# Patient Record
Sex: MALE | Employment: OTHER | URBAN - METROPOLITAN AREA
[De-identification: names, ages, dates, MRNs, and addresses within clinical notes are randomized per-mention and may not be internally consistent; named-entity substitution may affect disease eponyms.]

---

## 2022-03-14 ENCOUNTER — OFFICE VISIT (OUTPATIENT)
Dept: UROLOGY | Facility: CLINIC | Age: 67
End: 2022-03-14
Payer: MEDICARE

## 2022-03-14 DIAGNOSIS — R35.0 URINARY FREQUENCY: ICD-10-CM

## 2022-03-14 DIAGNOSIS — R39.198 DIFFICULTY VOIDING: Primary | ICD-10-CM

## 2022-03-14 DIAGNOSIS — R39.15 URINARY URGENCY: ICD-10-CM

## 2022-03-14 DIAGNOSIS — N52.9 ERECTILE DYSFUNCTION, UNSPECIFIED ERECTILE DYSFUNCTION TYPE: ICD-10-CM

## 2022-03-14 PROCEDURE — 99204 PR OFFICE/OUTPT VISIT, NEW, LEVL IV, 45-59 MIN: ICD-10-PCS | Mod: S$GLB,,, | Performed by: NURSE PRACTITIONER

## 2022-03-14 PROCEDURE — 99204 OFFICE O/P NEW MOD 45 MIN: CPT | Mod: S$GLB,,, | Performed by: NURSE PRACTITIONER

## 2022-03-14 RX ORDER — ATORVASTATIN CALCIUM 80 MG/1
80 TABLET, FILM COATED ORAL NIGHTLY
COMMUNITY
Start: 2022-01-24

## 2022-03-14 RX ORDER — OXYBUTYNIN CHLORIDE 5 MG/1
5 TABLET ORAL 3 TIMES DAILY
Qty: 90 TABLET | Refills: 11 | Status: SHIPPED | OUTPATIENT
Start: 2022-03-14 | End: 2023-03-14

## 2022-03-14 RX ORDER — LEVOTHYROXINE SODIUM 125 UG/1
125 TABLET ORAL DAILY
COMMUNITY
Start: 2022-01-20

## 2022-03-14 RX ORDER — LISINOPRIL AND HYDROCHLOROTHIAZIDE 10; 12.5 MG/1; MG/1
1 TABLET ORAL DAILY
COMMUNITY
Start: 2022-02-14

## 2022-03-14 RX ORDER — TADALAFIL 20 MG/1
20 TABLET ORAL DAILY PRN
Qty: 10 TABLET | Refills: 11 | Status: SHIPPED | OUTPATIENT
Start: 2022-03-14 | End: 2022-12-22 | Stop reason: SDUPTHER

## 2022-03-14 RX ORDER — SILDENAFIL CITRATE 20 MG/1
TABLET ORAL
COMMUNITY
Start: 2021-12-09 | End: 2022-12-22

## 2022-03-14 NOTE — PROGRESS NOTES
Subjective:       Patient ID: Julio Bhagat is a 67 y.o. male.    Chief Complaint: Urinary Frequency, Other (Slow stream- pt states his stream sprays out), Benign Prostatic Hypertrophy, and Erectile Dysfunction (Pt would like to try something other then viagra. It gives him awful headaches.)      HPI: 67-year-old male, new to [facility], referred for difficulty voiding.  Patient states he has has a history of difficulty voiding.  States he has a slow stream.  Patient states he will have episodes of spraying.  Will have frequency and urgency.  States he has about 1 episode of nocturia per night.    Patient states he had tried Flomax 0.4 mg daily at 1 time with no improvement.    Patient does report that approximately 30 years ago he was diagnosed with a urethral stricture.    Patient also complains of some erectile dysfunction.  Patient uses generic sildenafil.  Patient states he gets a headache with sildenafil.  Patient states he feels at times does not work very well.    Patient denies any odor the urine.  Denies any fever.  Denies any blood in the urine.  Denies any significant weight loss.  No other urinary complaints at this time.    Patient had a PSA by his PCP on 02/23/2022.  PSA at that time was 0.99.       Past Medical History:   Past Medical History:   Diagnosis Date    Coronary artery disease     Hypertension        Past Surgical Historical:   Past Surgical History:   Procedure Laterality Date    CATARACT EXTRACTION      spinal injections          Medications:   Medication List with Changes/Refills   New Medications    OXYBUTYNIN (DITROPAN) 5 MG TAB    Take 1 tablet (5 mg total) by mouth 3 (three) times daily.    TADALAFIL (CIALIS) 20 MG TAB    Take 1 tablet (20 mg total) by mouth daily as needed (ed).   Current Medications    ATORVASTATIN (LIPITOR) 80 MG TABLET    Take 80 mg by mouth nightly.    LEVOTHYROXINE (SYNTHROID) 125 MCG TABLET    Take 125 mcg by mouth once daily.     LISINOPRIL-HYDROCHLOROTHIAZIDE (PRINZIDE,ZESTORETIC) 10-12.5 MG PER TABLET    Take 1 tablet by mouth once daily.    SILDENAFIL (REVATIO) 20 MG TAB    TAKE 2 TO 5 TABLETS BY MOUTH ONCE A DAY AS NEEDED        Past Social History:   Social History     Socioeconomic History    Marital status:    Tobacco Use    Smoking status: Never Smoker    Smokeless tobacco: Never Used   Substance and Sexual Activity    Alcohol use: Yes    Drug use: Never    Sexual activity: Yes       Allergies: Review of patient's allergies indicates:  No Known Allergies     Family History: History reviewed. No pertinent family history.     Review of Systems:  Review of Systems   Constitutional: Negative for activity change and appetite change.   HENT: Negative for congestion and dental problem.    Eyes: Negative for visual disturbance.   Respiratory: Negative for chest tightness and shortness of breath.    Cardiovascular: Negative for chest pain.   Gastrointestinal: Negative for abdominal distention and abdominal pain.   Genitourinary: Positive for difficulty urinating, frequency and urgency. Negative for decreased urine volume, dysuria, enuresis, flank pain, genital sores, hematuria, penile discharge, penile pain, penile swelling, scrotal swelling and testicular pain.   Musculoskeletal: Negative for back pain and neck pain.   Skin: Negative for color change.   Neurological: Negative for dizziness.   Hematological: Negative for adenopathy.   Psychiatric/Behavioral: Negative for agitation, behavioral problems and confusion.       Physical Exam:  Physical Exam  Vitals and nursing note reviewed.   Constitutional:       Appearance: He is well-developed.   HENT:      Head: Normocephalic.   Eyes:      Pupils: Pupils are equal, round, and reactive to light.   Cardiovascular:      Rate and Rhythm: Normal rate and regular rhythm.      Heart sounds: Normal heart sounds.   Pulmonary:      Effort: Pulmonary effort is normal.      Breath sounds:  Normal breath sounds.   Abdominal:      General: Bowel sounds are normal.      Palpations: Abdomen is soft.   Genitourinary:     Penis: Normal.       Prostate: Enlarged.      Rectum: Normal.      Comments: Prostate is mildly enlarged.  Prostate smooth with no nodules and nontender.  Prostate is symmetrical.  Musculoskeletal:         General: Normal range of motion.      Cervical back: Normal range of motion and neck supple.   Skin:     General: Skin is warm and dry.   Neurological:      Mental Status: He is alert and oriented to person, place, and time.   Psychiatric:         Behavior: Behavior normal.       Bladder scan:  0 cc  Urinalysis:  Normal    Assessment/Plan:   1. Difficulty voiding/frequency/urgency:  Patient's bladder scan is 0 cc. He has failed Flomax in the past.  Will do trial of oxybutynin XL 15 mg daily.  Will schedule patient for cysto for further evaluation and rule out stricture.    2. Erectile dysfunction:  Patient will do trial generic Cialis 20 mg as needed.  Patient will notify us of the results.    Follow-up to be arranged pending cysto.  Problem List Items Addressed This Visit    None     Visit Diagnoses     Difficulty voiding    -  Primary    Relevant Orders    Cystoscopy    Urinary frequency        Relevant Medications    oxybutynin (DITROPAN) 5 MG Tab    Other Relevant Orders    Cystoscopy    Urinary urgency        Relevant Medications    oxybutynin (DITROPAN) 5 MG Tab    Other Relevant Orders    Cystoscopy    Erectile dysfunction, unspecified erectile dysfunction type        Relevant Medications    tadalafiL (CIALIS) 20 MG Tab

## 2022-06-06 ENCOUNTER — TELEPHONE (OUTPATIENT)
Dept: UROLOGY | Facility: CLINIC | Age: 67
End: 2022-06-06
Payer: MEDICARE

## 2022-06-07 ENCOUNTER — PROCEDURE VISIT (OUTPATIENT)
Dept: UROLOGY | Facility: CLINIC | Age: 67
End: 2022-06-07
Payer: MEDICARE

## 2022-06-07 VITALS
SYSTOLIC BLOOD PRESSURE: 112 MMHG | BODY MASS INDEX: 29.27 KG/M2 | HEIGHT: 70 IN | DIASTOLIC BLOOD PRESSURE: 58 MMHG | RESPIRATION RATE: 20 BRPM | OXYGEN SATURATION: 100 % | HEART RATE: 42 BPM | WEIGHT: 204.44 LBS

## 2022-06-07 DIAGNOSIS — R39.198 DIFFICULTY VOIDING: ICD-10-CM

## 2022-06-07 DIAGNOSIS — R35.0 URINARY FREQUENCY: ICD-10-CM

## 2022-06-07 DIAGNOSIS — R39.15 URINARY URGENCY: ICD-10-CM

## 2022-06-07 PROCEDURE — 52000 CYSTOURETHROSCOPY: CPT | Mod: S$GLB,,, | Performed by: UROLOGY

## 2022-06-07 PROCEDURE — 52000 CYSTOSCOPY: ICD-10-PCS | Mod: S$GLB,,, | Performed by: UROLOGY

## 2022-06-07 NOTE — PROCEDURES
Cystoscopy    Date/Time: 6/7/2022 11:00 AM  Performed by: Jm Pleitez MD  Authorized by: Saw Villalobos NP     Consent Done?:  Yes (Written)  Timeout: prior to procedure the correct patient, procedure, and site was verified    Prep: patient was prepped and draped in usual sterile fashion    Anesthesia:  Intraurethral instillation  Indications: hematuria    Position:  Supine  Anesthesia:  Intraurethral instillation  Patient sedated?: No    Preparation: Patient was prepped and draped in usual sterile fashion    Scope type:  Flexible cystoscope  External exam normal: Yes    Urethra normal: Yes    Prostate normal: Yes    Comments:      Patient was brought to the procedure room placed on the table padded prepped and draped in usual sterile fashion in supine position. The cystoscope was inserted into the urethra and advanced the urethra was normal prostate showed greater than expected enlargement for patient's age, the bladder is entered and inspected is found to be free of tumor stone or foreign body.  Bilateral ureteral orifices were identified and noted to be normal in appearance with clear efflux of urine at this point the scope was removed the patient tolerated the procedure well there were no complications    Impression:  Patient's prostate is certainly larger than average I believe he would benefit some degree from a Transurethral resection of the prostate he is resistant to alpha blockers as they cause retrograde ejaculation and that causes him some distress.  This point the patient prefers to stay off of any medication I have informed him that if his symptoms worsen he is free to come back and we can discuss TURP or additional medications

## 2022-06-07 NOTE — PATIENT INSTRUCTIONS
Patient Education       Cystoscopy Discharge Instructions   About this topic   Your kidneys make urine. It is stored in your bladder. The urethra is a tube at the bottom of the bladder. Urine flows out of this tube. Sometimes, there is a blockage and urine is not able to leave the body.  A cystoscopy is a procedure that lets the doctor see the inside of your bladder and urethra. The doctor does it to:  Look for stones or tumors blocking the bladder and urethra  Look for changes or injury inside the bladder  Take a tissue sample from the inside of your bladder  Look for reasons for blood in the urine, pain with urination, or why you are passing urine often  Look for prostate problems     What care is needed at home?   Ask your doctor what you need to do when you go home. Make sure you ask questions if you do not understand what the doctor says. This way you will know what you need to do.  Take a warm bath or use a warm wet washcloth over the opening to the urethra. This will help to ease any pain. Do this as needed.  Drink 6 to 8 glasses of water a day and 3 to 4 glasses in the first few hours after the procedure to flush out your bladder and reduce irritation.  You may see some blood in your urine for a few days. This is normal.  Empty your bladder as soon as you feel the need to. Don't delay going to the bathroom. It stretches and weakens the bladder.  What follow-up care is needed?   Your doctor may ask you to make visits to the office to check on your progress. Be sure to keep these visits.  If you had a biopsy, talk with your doctor about the results.  What drugs may be needed?   The doctor may order drugs to:  Help with pain  Fight an infection  Help with bladder spasms  Will physical activity be limited?   Talk to your doctor about when you may go back to your normal activities like work, driving, or sex.  What problems could happen?   Bleeding  Infection  Injury to the bladder and urethra  Discomfort in the  urethra area  Burning sensation for a short time  Upset stomach  When do I need to call the doctor?   Signs of infection. These include a fever of 100.4°F (38°C) or higher, chills, pain with passing urine.  Pain that does not go away even with drugs or that lasts longer than 2 days  Too much blood in your urine  Passing large dime-sized clots  Cloudy urine  Little or no urine or not able to pass urine  Abdominal pain and nausea  Teach Back: Helping You Understand   The Teach Back Method helps you understand the information we are giving you. After you talk with the staff, tell them in your own words what you learned. This helps to make sure the staff has described each thing clearly. It also helps to explain things that may have been confusing. Before going home, make sure you can do these:  I can tell you about my procedure.  I can tell you what may help ease my pain.  I can tell you what I will do if I have a fever, chills, or am not able to pass urine.  Where can I learn more?   American Cancer Society  https://www.cancer.org/treatment/understanding-your-diagnosis/tests/endoscopy/cystoscopy.html   Can[employer] Research [employer]  https://www.cancerresearchuk.org/about-cancer/bladder-cancer/getting-diagnosed/tests-diagnose/cystoscopy   NHS Choices  http://www.nhs.uk/conditions/Cystoscopy/Pages/Introduction.aspx   Last Reviewed Date   2021-04-22  Consumer Information Use and Disclaimer   This information is not specific medical advice and does not replace information you receive from your health care provider. This is only a brief summary of general information. It does NOT include all information about conditions, illnesses, injuries, tests, procedures, treatments, therapies, discharge instructions or life-style choices that may apply to you. You must talk with your health care provider for complete information about your health and treatment options. This information should not be used to decide whether or not to accept your  health care providers advice, instructions or recommendations. Only your health care provider has the knowledge and training to provide advice that is right for you.  Copyright   Copyright © 2021 [employer] and its affiliates and/or licensors. All rights reserved.

## 2022-12-20 ENCOUNTER — TELEPHONE (OUTPATIENT)
Dept: UROLOGY | Facility: CLINIC | Age: 67
End: 2022-12-20
Payer: MEDICARE

## 2022-12-20 NOTE — TELEPHONE ENCOUNTER
----- Message from Justo Randle sent at 12/20/2022 10:40 AM CST -----  Contact: pt      Who Called:jonathan   Who Left Message for Patient:  Does the patient know what this is regarding?:pt needs refill on tadalafiL (CIALIS) 20 MG Tab  Would the patient rather a call back or a response via [facility]?   Best Call Back Number:.[phone]    Additional Information: .  [id number] - [facility] [id number] [facility] [address]  Phone: [phone] Fax: [phone]    Pt would like a 15 or 20 day supply

## 2022-12-22 DIAGNOSIS — N52.9 ERECTILE DYSFUNCTION, UNSPECIFIED ERECTILE DYSFUNCTION TYPE: ICD-10-CM

## 2022-12-22 RX ORDER — TADALAFIL 20 MG/1
20 TABLET ORAL DAILY PRN
Qty: 10 TABLET | Refills: 3 | Status: SHIPPED | OUTPATIENT
Start: 2022-12-22 | End: 2023-02-27 | Stop reason: SDUPTHER

## 2022-12-22 NOTE — TELEPHONE ENCOUNTER
----- Message from Merry Sarabia sent at 12/22/2022  8:46 AM CST -----  Regarding: Refil  Contact: patient  .Type:  RX Refill Request    Who Called: Julio  Refill or New Rx:Refill  RX Name and Strength:tadalafiL (CIALIS) 20 MG Tab  How is the patient currently taking it? (ex. 1XDay):daily  Is this a 30 day or 90 day RX:10 day supply  Preferred Pharmacy with phone number:  [facility][id number] - [place], [place] - [address] [address]  Phone: [phone] Fax: [phone]      Local or Mail Order:local  Ordering Provider:Dr Villalobos  Would the patient rather a call back or a response via [facility]? Call back  Best Call Back Number:[phone] (home)     Additional Information: Patient advised he would like a 20 day supply of the medication.

## 2022-12-22 NOTE — TELEPHONE ENCOUNTER
Pt reports he did not know cialis was supposed to be daily prn, he states bottle has daily. He has been breaking tablets in half and taking daily. Requesting refills.

## 2023-02-27 DIAGNOSIS — N52.9 ERECTILE DYSFUNCTION, UNSPECIFIED ERECTILE DYSFUNCTION TYPE: ICD-10-CM

## 2023-02-27 RX ORDER — TADALAFIL 20 MG/1
20 TABLET ORAL DAILY PRN
Qty: 10 TABLET | Refills: 3 | Status: SHIPPED | OUTPATIENT
Start: 2023-02-27 | End: 2023-12-19

## 2023-02-27 NOTE — TELEPHONE ENCOUNTER
----- Message from Yazmin Mckee sent at 2/27/2023  8:45 AM CST -----  Contact: pt  Pt calling for refill on tadalafiL (CIALIS) 20 MG Pt can be reached at [phone]      [facility][id number] - [facility] [address] [facility]  [address] [address]  Phone: [phone] Fax: [phone]    Thanks,

## 2023-12-16 DIAGNOSIS — N52.9 ERECTILE DYSFUNCTION, UNSPECIFIED ERECTILE DYSFUNCTION TYPE: ICD-10-CM

## 2023-12-19 RX ORDER — TADALAFIL 20 MG/1
20 TABLET ORAL DAILY PRN
Qty: 10 TABLET | Refills: 0 | Status: SHIPPED | OUTPATIENT
Start: 2023-12-19

## 2024-03-27 DIAGNOSIS — R91.8 LUNG NODULES: Primary | ICD-10-CM

## 2024-05-16 ENCOUNTER — CLINICAL SUPPORT (OUTPATIENT)
Dept: PULMONOLOGY | Facility: CLINIC | Age: 69
End: 2024-05-16
Payer: MEDICARE

## 2024-05-16 DIAGNOSIS — R91.8 LUNG NODULES: Primary | ICD-10-CM

## 2024-05-16 DIAGNOSIS — R91.8 LUNG NODULES: ICD-10-CM

## 2024-05-16 PROCEDURE — 94729 DIFFUSING CAPACITY: CPT | Mod: S$GLB,,, | Performed by: INTERNAL MEDICINE

## 2024-05-16 PROCEDURE — 94726 PLETHYSMOGRAPHY LUNG VOLUMES: CPT | Mod: S$GLB,,, | Performed by: INTERNAL MEDICINE

## 2024-05-16 PROCEDURE — 94010 BREATHING CAPACITY TEST: CPT | Mod: S$GLB,,, | Performed by: INTERNAL MEDICINE

## 2024-05-27 ENCOUNTER — OFFICE VISIT (OUTPATIENT)
Dept: PULMONOLOGY | Facility: CLINIC | Age: 69
End: 2024-05-27
Payer: MEDICARE

## 2024-05-27 VITALS
DIASTOLIC BLOOD PRESSURE: 74 MMHG | BODY MASS INDEX: 29.62 KG/M2 | SYSTOLIC BLOOD PRESSURE: 119 MMHG | HEIGHT: 69 IN | WEIGHT: 200 LBS | RESPIRATION RATE: 18 BRPM | HEART RATE: 48 BPM | OXYGEN SATURATION: 96 %

## 2024-05-27 DIAGNOSIS — R91.1 LUNG NODULE: ICD-10-CM

## 2024-05-27 DIAGNOSIS — R09.89 HYPERINFLATION OF LUNGS: ICD-10-CM

## 2024-05-27 DIAGNOSIS — Z87.891 HISTORY OF TOBACCO ABUSE: ICD-10-CM

## 2024-05-27 DIAGNOSIS — J92.0 CALCIFIED PLEURAL PLAQUE DUE TO ASBESTOS EXPOSURE: Primary | ICD-10-CM

## 2024-05-27 DIAGNOSIS — R91.1 SOLITARY PULMONARY NODULE: ICD-10-CM

## 2024-05-27 PROCEDURE — 99205 OFFICE O/P NEW HI 60 MIN: CPT | Mod: S$GLB,,, | Performed by: INTERNAL MEDICINE

## 2024-05-27 RX ORDER — FLUTICASONE PROPIONATE 220 UG/1
1 AEROSOL, METERED RESPIRATORY (INHALATION) 2 TIMES DAILY
Qty: 12 G | Refills: 0 | Status: SHIPPED | OUTPATIENT
Start: 2024-05-27 | End: 2025-05-27

## 2024-05-27 RX ORDER — EZETIMIBE AND SIMVASTATIN 10; 10 MG/1; MG/1
1 TABLET ORAL NIGHTLY
COMMUNITY

## 2024-05-27 NOTE — PROGRESS NOTES
Subjective:    Patient Identification:   Patient ID: Julio Bhagat is a 69 y.o. male.    Referring Provider:  Dr. Forbes    Chief Complaint:  Lung nodule    History of Present Illness:    Julio Bhagat is a 69 y.o. male who presents for the evaluation of lung nodule.  It is unclear why this CT chest was done.  Patient had complained of recurrent episodes of chest tightness.  He had an abnormal CTA and underwent a left heart catheterization and was told everything was normal.  These episodes of chest tightness are associated with productive cough but no other symptom.  Patient denies any shortness of breath.    He has smoked for 30 pack years and quit about 15 years ago.  He has history of exposure to asbestos while he worked in [employer].  He has evidence of pleural plaques on his CT scan.  There is no interstitial lung disease.  Incidentally he was found to have a right upper lobe 5 mm nodule.  There is no comparative study from the past which shows right apical area where this nodule is present currently.  Unfortunately, last year CCTA had no apical images for comparison.  His PFTs show evidence of mild hyperinflation.  He denies any wheezing.  Once again, he denies any shortness of breath.  He has been treated with several rounds of antibiotics and steroids for this chest tightness/congestion.    Review of Systems:  Review of Systems   Constitutional:  Negative for fever, chills, weight loss, weight gain, activity change, appetite change, fatigue, night sweats and weakness.   HENT:  Negative for nosebleeds, postnasal drip, rhinorrhea, sinus pressure, sore throat, trouble swallowing, voice change, ear pain and hearing loss.    Eyes:  Negative for redness and itching.   Respiratory:  Positive for sputum production. Negative for hemoptysis, choking, chest tightness, shortness of breath, wheezing, orthopnea, previous hospitialization due to pulmonary problems, asthma nighttime symptoms, pleurisy, dyspnea on extertion,  use of rescue inhaler and Paroxysmal Nocturnal Dyspnea.         Chest congestion/tightness   Cardiovascular:  Negative for chest pain, palpitations and leg swelling.   Genitourinary:  Negative for difficulty urinating and hematuria.   Endocrine:  Negative for polydipsia, polyphagia, cold intolerance, heat intolerance and polyuria.    Musculoskeletal:  Negative for arthralgias, back pain, gait problem, joint swelling and myalgias.   Skin:  Negative for rash.   Gastrointestinal:  Negative for nausea, vomiting, abdominal pain, abdominal distention and acid reflux.   Neurological:  Negative for dizziness, syncope, weakness, light-headedness and headaches.   Hematological:  Negative for adenopathy. Does not bruise/bleed easily and no excessive bruising.   Psychiatric/Behavioral:  Negative for confusion and sleep disturbance. The patient is not nervous/anxious.        Allergies: Review of patient's allergies indicates:  No Known Allergies    Medications:      Past Medical History:      Past Medical History:   Diagnosis Date    Coronary artery disease     Hypertension        Family History:      Family History   Problem Relation Name Age of Onset    Asbestos Father          Social History:      Past Surgical History:   Procedure Laterality Date    CATARACT EXTRACTION      spinal injections         Physical Exam:  Vitals:    05/27/24 1129   BP: 119/74   Pulse: (!) 48   Resp: 18     Physical Exam   Constitutional: He is oriented to person, place, and time. He appears not cachectic. No distress.   HENT:   Head: Normocephalic.   Right Ear: External ear normal.   Left Ear: External ear normal.   Nose: Nose normal. No mucosal edema. No polyps.   Mouth/Throat: Oropharynx is clear and moist. Normal dentition. No oropharyngeal exudate.   Neck: No JVD present. No tracheal deviation present. No thyromegaly present.   Cardiovascular: Normal rate, regular rhythm, normal heart sounds and intact distal pulses. Exam reveals no gallop and  no friction rub.   No murmur heard.  Pulmonary/Chest: Normal expansion, symmetric chest wall expansion, effort normal and breath sounds normal. No stridor. No respiratory distress. He has no decreased breath sounds. He has no wheezes. He has no rhonchi. He has no rales. Chest wall is not dull to percussion. He exhibits no tenderness. Negative for egophony. Negative for tactile fremitus.   Abdominal: Soft. Bowel sounds are normal. He exhibits no distension and no mass. There is no hepatosplenomegaly. There is no abdominal tenderness. There is no rebound and no guarding. No hernia.   Musculoskeletal:         General: No tenderness, deformity or edema. Normal range of motion.      Cervical back: Normal range of motion and neck supple.   Lymphadenopathy: No supraclavicular adenopathy is present.     He has no cervical adenopathy.     He has no axillary adenopathy.   Neurological: He is alert and oriented to person, place, and time. He has normal reflexes. He displays normal reflexes. No cranial nerve deficit. He exhibits normal muscle tone.   Skin: Skin is warm and dry. No rash noted. He is not diaphoretic. No cyanosis or erythema. No pallor. Nails show no clubbing.   Psychiatric: He has a normal mood and affect. His behavior is normal. Judgment and thought content normal.              Accessory Clinical Data:  Chest x-ray:    CT scan:  I have reviewed the CT images and findings are dictated under HPI.    PFTs:  Mild air trapping otherwise normal FEV1 and FVC with slightly reduced ratio of 79 and normal diffusion capacity.    6MWT:     TTE:    Lab data:    All radiographic imaging of the chest, PFT tracings/data, and 6MWT data have been independently reviewed and interpreted unless otherwise specified.     Assessment and Plan:        Problem List Items Addressed This Visit    None  Visit Diagnoses       Calcified pleural plaque due to asbestos exposure    -  Primary    History of tobacco abuse        Lung nodule         Hyperinflation of lungs        Relevant Medications    fluticasone propionate (FLOVENT HFA) 220 mcg/actuation inhaler    Solitary pulmonary nodule        Relevant Orders    CT Chest Without Contrast           Orders Placed This Encounter   Procedures    CT Chest Without Contrast     Standing Status:   Future     Number of Occurrences:   1     Standing Expiration Date:   5/27/2025     Scheduling Instructions:      Ion protocol for robot assisted bronchoscopy     Order Specific Question:   May the Radiologist modify the order per protocol to meet the clinical needs of the patient?     Answer:   Yes      I am not sure what is causing his symptoms of chest congestion and tightness.  There are no infiltrates on CT scan or wheezing reported.  I will avoid systemic steroids or antibiotics.  We will do a trial of inhaled steroids.  He does have evidence of asbestos exposure in the form of pleural plaques but no evidence of interstitial lung disease on his CT scan.  His PFTs do not suggest restrictive lung defect either.  There is no specific treatment for it.    For incidentally found 5 mm nodule in a high-risk patient an optional CT at 12 months is recommended by Fleischner society guidelines.  However, patient is extremely anxious about it and does have dual risk factors for bronchogenic carcinoma such as history of tobacco abuse as well as asbestos exposure.  Therefore, we will repeat a CT chest in 6 months.  If the size remained stable, we will repeat CT in 1 year.  If the size increases, he will need further testing and are tissue sampling.    More than 50% of 60 min time was spent face-to-face on counseling, in reviewing the imaging studies, reviewing notes from primary care provider, performing appropriate examination, counseling and educating the patient regarding the findings on CT scan, ordering medications and appropriate follow-up imaging studies, documenting clinical information in the electronic medical  record, care coordination as well as communicating with the primary referring physician.      Follow up in about 6 months (around 11/27/2024).  Thank you very much for involving me in the care of this patient.  Please do not hesitate to reach me if you have any further questions or concerns.    This note is dictated on M*Modal word recognition program.  There are word recognition mistakes that are occasionally missed on review.

## 2024-07-23 ENCOUNTER — TELEPHONE (OUTPATIENT)
Dept: PULMONOLOGY | Facility: CLINIC | Age: 69
End: 2024-07-23
Payer: MEDICARE

## 2024-08-08 RX ORDER — FLUTICASONE FUROATE 100 UG/1
1 POWDER RESPIRATORY (INHALATION) DAILY
Qty: 30 EACH | Refills: 6 | Status: SHIPPED | OUTPATIENT
Start: 2024-08-08

## 2024-11-25 ENCOUNTER — OFFICE VISIT (OUTPATIENT)
Dept: PULMONOLOGY | Facility: CLINIC | Age: 69
End: 2024-11-25
Payer: MEDICARE

## 2024-11-25 VITALS
WEIGHT: 199.38 LBS | HEART RATE: 49 BPM | SYSTOLIC BLOOD PRESSURE: 102 MMHG | OXYGEN SATURATION: 96 % | RESPIRATION RATE: 18 BRPM | HEIGHT: 69 IN | DIASTOLIC BLOOD PRESSURE: 68 MMHG | BODY MASS INDEX: 29.53 KG/M2

## 2024-11-25 DIAGNOSIS — R91.1 SOLITARY PULMONARY NODULE: ICD-10-CM

## 2024-11-25 DIAGNOSIS — J92.0 CALCIFIED PLEURAL PLAQUE DUE TO ASBESTOS EXPOSURE: Primary | ICD-10-CM

## 2024-11-25 DIAGNOSIS — Z87.891 HISTORY OF TOBACCO USE: ICD-10-CM

## 2024-11-25 DIAGNOSIS — R91.1 LUNG NODULE: ICD-10-CM

## 2024-11-25 NOTE — PROGRESS NOTES
Subjective:    Patient Identification:   Patient ID: Julio Bhagat is a 69 y.o. male.    Referring Provider:   established patient    Chief Complaint:   follow-up on CT chest    History of Present Illness:    Julio Bhagat is a 69 y.o. male who presents  to follow up on CT chest results.  5 mm nodule has been stable with 6 monthly follow-up CT chest.  He does have evidence of asbestos exposure but not asbestosis.  He does have bilateral pleural plaques.  He denies any respiratory symptoms.  His PFTs showed mild obstruction but not meeting COPD criteria.  He did have mild air trapping.  He currently does not have any respiratory symptoms.  His father had mesothelioma secondary to asbestos exposure.    Review of Systems:  Review of Systems   Constitutional:  Negative for fever, chills, weight loss, weight gain, activity change, appetite change, fatigue, night sweats and weakness.   HENT:  Negative for nosebleeds, postnasal drip, rhinorrhea, sinus pressure, sore throat, trouble swallowing, voice change, congestion, ear pain and hearing loss.    Eyes:  Negative for redness and itching.   Respiratory:  Negative for cough, hemoptysis, sputum production, choking, chest tightness, shortness of breath, wheezing, orthopnea, previous hospitialization due to pulmonary problems, asthma nighttime symptoms, pleurisy, dyspnea on extertion, use of rescue inhaler and Paroxysmal Nocturnal Dyspnea.    Cardiovascular:  Negative for chest pain, palpitations and leg swelling.   Genitourinary:  Negative for difficulty urinating and hematuria.   Endocrine:  Negative for polydipsia, polyphagia, cold intolerance, heat intolerance and polyuria.    Musculoskeletal:  Negative for arthralgias, back pain, gait problem, joint swelling and myalgias.   Skin:  Negative for rash.   Gastrointestinal:  Negative for nausea, vomiting, abdominal pain, abdominal distention and acid reflux.   Neurological:  Negative for dizziness, syncope, weakness,  light-headedness and headaches.   Hematological:  Negative for adenopathy. Does not bruise/bleed easily and no excessive bruising.   Psychiatric/Behavioral:  Negative for confusion and sleep disturbance. The patient is not nervous/anxious.          Allergies: Review of patient's allergies indicates:  No Known Allergies    Medications:      Past Medical History:      Past Medical History:   Diagnosis Date    Coronary artery disease     Hypertension        Family History:      Family History   Problem Relation Name Age of Onset    Asbestos Father          Social History:      Past Surgical History:   Procedure Laterality Date    CATARACT EXTRACTION      spinal injections         Physical Exam:  Vitals:    11/25/24 1246   BP: 102/68   Pulse: (!) 49   Resp: 18     Physical Exam   Constitutional: He is oriented to person, place, and time. He appears not cachectic. No distress.   HENT:   Head: Normocephalic.   Nose: Nose normal. No mucosal edema. No polyps.   Mouth/Throat: Oropharynx is clear and moist. Normal dentition. No oropharyngeal exudate.   Neck: No JVD present. No tracheal deviation present. No thyromegaly present.   Cardiovascular: Normal rate, regular rhythm, normal heart sounds and intact distal pulses. Exam reveals no gallop and no friction rub.   No murmur heard.  Pulmonary/Chest: Normal expansion, symmetric chest wall expansion, effort normal and breath sounds normal. No stridor. No respiratory distress. He has no decreased breath sounds. He has no wheezes. He has no rhonchi. He has no rales. Chest wall is not dull to percussion. He exhibits no tenderness. Negative for egophony. Negative for tactile fremitus.   Abdominal: Soft. Bowel sounds are normal. He exhibits no distension and no mass. There is no hepatosplenomegaly. There is no abdominal tenderness. There is no rebound and no guarding. No hernia.   Musculoskeletal:         General: No tenderness, deformity or edema. Normal range of motion.       Cervical back: Normal range of motion and neck supple.   Lymphadenopathy: No supraclavicular adenopathy is present.     He has no cervical adenopathy.     He has no axillary adenopathy.   Neurological: He is alert and oriented to person, place, and time. He displays normal reflexes. No cranial nerve deficit. He exhibits normal muscle tone.   Skin: Skin is warm and dry. No rash noted. He is not diaphoretic. No cyanosis or erythema. No pallor. Nails show no clubbing.   Psychiatric: He has a normal mood and affect. His behavior is normal. Judgment and thought content normal.            Accessory Clinical Data:  Chest x-ray:    CT scan:   Stable 5 mm right upper lobe nodule    PFTs:     6MWT:     TTE:    Lab data:    All radiographic imaging of the chest, PFT tracings/data, and 6MWT data have been independently reviewed and interpreted unless otherwise specified.     Assessment and Plan:        Problem List Items Addressed This Visit    None  Visit Diagnoses       Calcified pleural plaque due to asbestos exposure    -  Primary    History of tobacco use        Lung nodule        Solitary pulmonary nodule        Relevant Orders    CT Chest Without Contrast           Orders Placed This Encounter   Procedures    CT Chest Without Contrast     Standing Status:   Future     Number of Occurrences:   1     Standing Expiration Date:   11/25/2025     Scheduling Instructions:      [facility]on protocol for robot assisted bronchoscopy     Order Specific Question:   May the Radiologist modify the order per protocol to meet the clinical needs of the patient?     Answer:   Yes      Reassurance provided to the patient regarding stability of the right upper lobe 5 mm nodule.  Characteristics of high-risk nodules were discussed with the patient in detail.    Given the history of tobacco use and asbestos exposure, we will get repeat CT chest in 1 year.      Follow up in about 1 year (around 11/25/2025) for   With CT chest.     This note is dictated  on M*Modal word recognition program.  There are word recognition mistakes that are occasionally missed on review.

## 2024-12-24 ENCOUNTER — OFFICE VISIT (OUTPATIENT)
Dept: UROLOGY | Facility: CLINIC | Age: 69
End: 2024-12-24
Payer: MEDICARE

## 2024-12-24 VITALS
RESPIRATION RATE: 18 BRPM | OXYGEN SATURATION: 98 % | HEART RATE: 43 BPM | WEIGHT: 200 LBS | BODY MASS INDEX: 29.62 KG/M2 | HEIGHT: 69 IN | SYSTOLIC BLOOD PRESSURE: 118 MMHG | DIASTOLIC BLOOD PRESSURE: 64 MMHG

## 2024-12-24 DIAGNOSIS — N52.9 ERECTILE DYSFUNCTION, UNSPECIFIED ERECTILE DYSFUNCTION TYPE: Primary | ICD-10-CM

## 2024-12-24 LAB
BILIRUBIN, UA POC OHS: NEGATIVE
BLOOD, UA POC OHS: NEGATIVE
CLARITY, UA POC OHS: CLEAR
COLOR, UA POC OHS: YELLOW
GLUCOSE, UA POC OHS: NEGATIVE
KETONES, UA POC OHS: NEGATIVE
LEUKOCYTES, UA POC OHS: NEGATIVE
NITRITE, UA POC OHS: NEGATIVE
PH, UA POC OHS: 7
PROTEIN, UA POC OHS: NEGATIVE
SPECIFIC GRAVITY, UA POC OHS: 1.01
UROBILINOGEN, UA POC OHS: 1

## 2024-12-24 PROCEDURE — 99214 OFFICE O/P EST MOD 30 MIN: CPT | Mod: S$PBB,,, | Performed by: UROLOGY

## 2024-12-24 RX ORDER — ASPIRIN 81 MG/1
1 TABLET ORAL DAILY
COMMUNITY

## 2024-12-24 RX ORDER — LATANOPROST 50 UG/ML
SOLUTION/ DROPS OPHTHALMIC
COMMUNITY
Start: 2024-08-27

## 2024-12-24 RX ORDER — CELECOXIB 200 MG/1
200 CAPSULE ORAL
COMMUNITY

## 2024-12-24 NOTE — PROGRESS NOTES
Subjective:       Patient ID: Julio Bhagat is a 69 y.o. male.    Chief Complaint: No chief complaint on file.      HPI:  69-year-old male here for his routine BPH he complains of a low libido and nocturia I have scoped him in the past he would benefit from a TURP however he does not like the idea of retrograde ejaculation and therefore does not take Flomax for the same reason.    Past Medical History:   Past Medical History:   Diagnosis Date    Coronary artery disease     Hypertension        Past Surgical Historical:   Past Surgical History:   Procedure Laterality Date    CATARACT EXTRACTION      HERNIA REPAIR      KNEE ARTHROPLASTY Right     spinal injections          Medications:   Medication List with Changes/Refills   Current Medications    ASPIRIN (ECOTRIN) 81 MG EC TABLET    Take 1 tablet by mouth once daily.    ATORVASTATIN (LIPITOR) 80 MG TABLET    Take 80 mg by mouth nightly.    CELECOXIB (CELEBREX) 200 MG CAPSULE    Take 200 mg by mouth.    EZETIMIBE-SIMVASTATIN 10-10 MG (VYTORIN) 10-10 MG PER TABLET    Take 1 tablet by mouth every evening.    FLUTICASONE FUROATE (ARNUITY ELLIPTA) 100 MCG/ACTUATION INHALER    Inhale 1 puff into the lungs once daily. Controller    LATANOPROST 0.005 % OPHTHALMIC SOLUTION    Instill 1 drop into both eyes at bedtime (FOR USE AFTER STEROID INJECTIONS)    LEVOTHYROXINE (SYNTHROID) 125 MCG TABLET    Take 125 mcg by mouth once daily.    LISINOPRIL-HYDROCHLOROTHIAZIDE (PRINZIDE,ZESTORETIC) 10-12.5 MG PER TABLET    Take 1 tablet by mouth once daily.    OXYBUTYNIN (DITROPAN) 5 MG TAB    Take 1 tablet (5 mg total) by mouth 3 (three) times daily.    TADALAFIL (CIALIS) 20 MG TAB    TAKE ONE TABLET BY MOUTH DAILY AS NEEDED        Past Social History:   Social History     Socioeconomic History    Marital status:    Tobacco Use    Smoking status: Former     Types: Cigarettes    Smokeless tobacco: Never   Substance and Sexual Activity    Alcohol use: Yes    Drug use: Never     Sexual activity: Yes       Allergies: Review of patient's allergies indicates:  No Known Allergies     Family History:   Family History   Problem Relation Name Age of Onset    Asbestos Father          Review of Systems:  Review of Systems   Constitutional:  Negative for activity change and appetite change.   HENT:  Negative for congestion and dental problem.    Eyes:  Negative for visual disturbance.   Respiratory:  Negative for chest tightness and shortness of breath.    Cardiovascular:  Negative for chest pain.   Gastrointestinal:  Negative for abdominal distention and abdominal pain.   Genitourinary:  Negative for decreased urine volume, difficulty urinating, dysuria, enuresis, flank pain, frequency, genital sores, hematuria, penile discharge, penile pain, penile swelling, scrotal swelling, testicular pain and urgency.   Musculoskeletal:  Negative for back pain and neck pain.   Skin:  Negative for color change.   Neurological:  Negative for dizziness.   Hematological:  Negative for adenopathy.   Psychiatric/Behavioral:  Negative for agitation, behavioral problems and confusion.        Physical Exam:  Physical Exam  Constitutional:       General: He is not in acute distress.     Appearance: He is well-developed.   HENT:      Head: Normocephalic and atraumatic.      Nose: Nose normal.   Eyes:      General: No scleral icterus.     Conjunctiva/sclera: Conjunctivae normal.      Pupils: Pupils are equal, round, and reactive to light.   Neck:      Thyroid: No thyromegaly.      Trachea: No tracheal deviation.   Cardiovascular:      Rate and Rhythm: Normal rate and regular rhythm.      Heart sounds: Normal heart sounds.   Pulmonary:      Effort: Pulmonary effort is normal. No respiratory distress.      Breath sounds: Normal breath sounds. No wheezing or rales.   Abdominal:      General: Bowel sounds are normal. There is no distension.      Palpations: Abdomen is soft.      Tenderness: There is no abdominal tenderness.  There is no guarding or rebound.   Genitourinary:     Penis: Normal. No tenderness.       Prostate: Normal.   Musculoskeletal:         General: No deformity. Normal range of motion.      Cervical back: Neck supple.   Lymphadenopathy:      Cervical: No cervical adenopathy.   Skin:     General: Skin is warm and dry.      Findings: No erythema or rash.   Neurological:      Mental Status: He is alert and oriented to person, place, and time.      Cranial Nerves: No cranial nerve deficit.   Psychiatric:         Behavior: Behavior normal.         Assessment/Plan:       Problem List Items Addressed This Visit    None  Visit Diagnoses       Erectile dysfunction, unspecified erectile dysfunction type    -  Primary    Relevant Orders    POCT Bladder Scan    POCT Urinalysis(Instrument) (Completed)               Erectile dysfunction:   We discussed taking Cialis 20 mg on an empty stomach and how this is more effective he will try that     BPH with obstruction:   Patient would like to hold the course he would avoid meds or any of that    Low libido:   Discussed with him that we do not replace testosterone here but could have his testosterone checked by his primary care doctor in if his PSA is not elevated he also does not

## 2025-03-17 DIAGNOSIS — R91.1 SOLITARY PULMONARY NODULE: Primary | ICD-10-CM

## 2025-08-18 DIAGNOSIS — R91.1 SOLITARY PULMONARY NODULE: Primary | ICD-10-CM
